# Patient Record
Sex: MALE | Race: WHITE
[De-identification: names, ages, dates, MRNs, and addresses within clinical notes are randomized per-mention and may not be internally consistent; named-entity substitution may affect disease eponyms.]

---

## 2019-01-14 ENCOUNTER — HOSPITAL ENCOUNTER (OUTPATIENT)
Dept: HOSPITAL 46 - DS | Age: 64
LOS: 1 days | Discharge: HOME | End: 2019-01-15
Attending: UROLOGY
Payer: COMMERCIAL

## 2019-01-14 VITALS — HEIGHT: 74 IN | BODY MASS INDEX: 36.57 KG/M2 | WEIGHT: 284.99 LBS

## 2019-01-14 DIAGNOSIS — N13.8: ICD-10-CM

## 2019-01-14 DIAGNOSIS — R97.20: ICD-10-CM

## 2019-01-14 DIAGNOSIS — Z79.899: ICD-10-CM

## 2019-01-14 DIAGNOSIS — N41.1: ICD-10-CM

## 2019-01-14 DIAGNOSIS — N40.1: Primary | ICD-10-CM

## 2019-01-14 DIAGNOSIS — Z79.2: ICD-10-CM

## 2019-01-14 PROCEDURE — 0VT08ZZ RESECTION OF PROSTATE, VIA NATURAL OR ARTIFICIAL OPENING ENDOSCOPIC: ICD-10-PCS | Performed by: UROLOGY

## 2019-01-14 NOTE — NUR
THIS RN ASSUMING CARE OF PT. PT AWAKE AND RESPONDING APPROPRIATLY. PT DENIES
PAIN AND NAUSEA. SPINAL LEVEL S2. ASSESSMENT DONE. CATHETER DRAINING STRAW
COLORED URINE. IV FLUID STARTED. PT ASSISTED WITH ORDERING LUNCH. NO
ADDITIONAL REQUESTS OR COMPLAINTS AT THIS TIME. WIFE AT BEDSIDE. CALL LIGHT
WITHIN REACH. BED RAILS UP.

## 2019-01-14 NOTE — NUR
THIS RN TO ROOM TO CHECK ON PT. PT ASSISTED UP AND BACK TO CHAIR. PINK URINE
NOTED WITH TRANSFER. 3WAY BLADDER IRRIGATION FLUSHED AFTER TRANSFER. URINE
RETURNS TO LIGHT PINK. PT WATCHING TV. NO ADDITIONAL REQUESTS OR COMPLAINTS AT
THIS TIME.

## 2019-01-14 NOTE — NUR
VITALS AND ASSESSMENT DUE. THIS RN TO BEDSIDE. PT WORKING ON CALENDAR/SCHEDULE
WITH WIFE. PT DENIES PAIN AND NAUSEA. VITALS TAKEN. ASSESSMENT DONE. NO
ADDITIONAL DRAINAGE NOTED FROM PENIS. CATHETER DRAINING CLEAR YELLOW URINE.
IRRIGATION CLAMMPED. WATER REFILLED. PT CONTINUES PLANNING WITH WIFE. NO
ADDITIONAL REQUESTS OR COMPLAINTS. CALL LIGHT WITHIN REACH. BED RAILS UP.

## 2019-01-14 NOTE — NUR
01/14/19 1135 Sheets,Audrey
1125 PT ARRIVED TO PACU ON 6L VIA MASK, PT REACTIVE TO VERBAL STIMULI.
 
1128 PT REACHING UP AND GRABBING O2 MASK, O2 MASK REMOVED. PT DENIES
PAIN AND NAUSEA. PT MORE AWAKE AND REPORTS MOUTH DRY.
 
1135 PT SIPPING WATER PER REQUEST AND SITTING IN SEMI FOWLERS. CNI
TITRATED TO URINE, URINE IS CLEAR.

## 2019-01-14 NOTE — NUR
PT PLANS ON RETURNING TO HIS HOME WITH HIS WIFE UPON DC, WILL FOLLOW DURING
HIS STAY IN THE HOSPITAL.  NO NOTED BARRIERS TO DC.

## 2019-01-14 NOTE — NUR
VITALS, ASSESSMENT AND MEDICATION DUE. THIS RN TO BEDSIDE. PT TALKING WITH
CASE MANAGEMENT. VITALS TAKEN. PT DENIES PAIN AND NAUSEA. ASSESSMENT DONE.
CLEAR YELLOW URINE NOTED IN CATHETER. BLADDER IRRIGATION REMAINS CLAMPED. ABX
STARTED. PT WORKING ON TABLET. NO ADDITIONAL REQUESTS OR COMPLAINTS. CALL
LIGHT WITHIN REACH.

## 2019-01-14 NOTE — NUR
PATIENT IN CHAIR, WIFE IN ROOM. FRESH WATER GIVEN. CALL LIGHT IN REACH. NO
FURTHER NEEDS AT THIS TIME.

## 2019-01-14 NOTE — NUR
ROUNDED AS CHARGE. VITALS TAKEN AND RECORDED. PATIENT DENIES ANY COMMNETS,
QUESTIONS OR CONCERNS. CARTER EMPTIED. INTAKE AND OUPUT RECORDED. FELIPA RODRIGUEZ IN
THE ROOM. NO NEEDS NOTED. CALL LIGHT IN REACH.

## 2019-01-14 NOTE — NUR
PT IN , FINISHING LUNCH-WIFE AT BS. PT STATED HIS "BUNS" WERE TINGLING, BUT
OTHERWISE HE FELT FINE. THANKED ME FOR COMING BY, EXTENDED A BLESSING. WILL
FOLLOW AS NEEDED

## 2019-01-14 NOTE — NUR
RECIEVED CHANGE OF SHIFT REPORT FROM TAYLOR RODRIGUEZ. WHITE BOARD UPATED. PATIENT
RESTING IN BED, WATCHING TV. POSSESSIONS AT BEDSIDE. IV FLUIDS
INFUSING PER MAR ORDER. SCDS IN PLACE AND ON. ROOM AIR. CALL LIGHT WITHIN
REACH. NO MORE NEEDS AT THIS TIME.

## 2019-01-14 NOTE — NUR
PT ALERT, ORIENTED AND SUPPORTED  BY HIS WIFE IZA. PT IS PREPARED, HAD FEW
QUESTIONS. HE DID REQUEST PRAYER, WILL FOLLOW AS NEEDED

## 2019-01-14 NOTE — NUR
ROUNDED ON PATIENT TO REPLACE CONTINOUS IV FLUID BAG PER MAR ORDER. PATIENT
DENIES HAVING PAIN. POSSESSIONS AT BEDSIDE. CALL LIGHT WITHIN REACH. NO MORE
NEEDS AT THIS TIME.

## 2019-01-14 NOTE — NUR
PT TO ROOM 109. PT IS ALERT AND ORIENTED, HE REPORTS NO PAIN AT THIS TIME, NO
NAUSEA, REQUEST PUDDING. BEDSIDE REPORT FROM ZACHARY RODRIGUEZ PACU

## 2019-01-14 NOTE — NUR
ASSESSMENT COMPLETE. VITALS ASSESSED AND RECOREDED. INTAKE AND OUTPUT ASSESSED
AND RECORDED. CARTER BAG EMPTIED, DRAINAGE IN CARTER TUBING IS YELLOW/LIGHT
PINK. PATIENT DENIES HAVING CHEST PAIN, SOB, DIFFICULTY BREATHING OR PAIN.
CATHETER CARE PERFORMED. DRY, RED DRAINAGED NOTED ON TUBING NEAR TIP OF
PENIS, NO NEW DRAINAGE NOTED. IV ASSESSED TO BE PATENT AND WNL, PATIENT DENIES
PAIN AT IV SITE. SCDs IN PLACE AND ON. MEDICATION ADMINSTRATION COMPLETE PER
MAR ORDER. IV FLUIDS INFUSING PER MAR ORDER. PATIENT ON BEDREST PER ORDER. CBI
IS CLAMPED. FRESH WATER BROUGHT TO PATIENT. CALL LIGHT WITHIN REACH.
POSSESSIONS AT BEDSIDE. NO MORE NEEDS AT THIS TIME.

## 2019-01-14 NOTE — NUR
VITALS AND ASSESSMENT DUE. THIS RN TO BEDSIDE. PT FINISHING LUNCH. PT DENIES
PAIN AND NAUSEA. CATHETER DRAINING CLEAR YELLOW URINE. FLUIDS ENCORUAGED.
IRRIGATION SLOWED. PT VISITING WITH WIFE. ADDITIONAL PUDDING PROVIDED. NO
ADDITIONAL REQUESTS OR COMPLAINT AT THIS TIME.

## 2019-01-14 NOTE — NUR
PT POST OP DAY ZERO FOR TURP. CONTINIOUS BLADDER IRRIGATION CLAMPED, URINE
CLEAR YELLOW TO LIGHT PINK. MINMAL RED DRAINAGE NOTED FROM PENIS.
PT UP TO CHAIR FOR DINNER PER MD REQUEST. 0-1/10
PAIN THIS SHIFT, NO MEDICATION GIVEN. PT TOLERATING PO FOOD AND FLUIDS WELL.
PT EXPECTING DISCHARGE IN EARLY MORNING. PT USES CALL LIGHT APPROPRIATLY.

## 2019-01-14 NOTE — NUR
THIS RN TO ROOM TO ROUND WITH MD. MD STATES TO GET UP TO CHAIR FOR DINNER AND
MONITOR CATHETER DRAINAGE AFTER BEING UP TO CHAIR. NOTED. PT PLANS TO GET UP
TO CHAIR FOR DINNER.

## 2019-01-14 NOTE — NUR
STAT LOCK PLACED TO SECURE CARTER CATH. BLOOD DRAINAGE NOTED AT MEATUS, GAUZE
IN PLACE. URINE IN CARTER TUBING CLEAR STRAW COLOR.

## 2019-01-15 NOTE — NUR
ROUNDED ON PATIENT FOR MEDICATION ADMINSTRATION PER MAR ORDER. PATIENT RESTING
IN BED WITH EYES CLOSED, REPIRATORY RATE IS EVEN AND UNLABORED, NO SIGN OF
TENSING OR GRIMACING. PATIENT RESTING IN RELAXED POSITION. CARTER DRAINING
LIGHT PINK URINE. POSSESSIONS AT BEDSIDE. CALL LIGHT WITHIN REACH.

## 2019-01-15 NOTE — NUR
PT ALERT, ORIENTED AND CATCHING UP ON SOME E-MAIL. PLANS TO BE DC'D SHORTLY-IS
PLEASED WITH CARE HE HAS RECEIVED AND EXPECTED PROCEDURE TO BE MORE PAINFUL
AND MORE DISCOMFORT THANT HE HAS EXPERIENCED SO FAR. HAD GOOD VISIT, STUDENTS
IN TO TAKE VS, EXTENDED BLESSING, WILL FOLLOW AS NEEDED

## 2019-01-15 NOTE — NUR
REPORT RECIEVED FROM JENNIFER LEO AT BEDSIDE. PT AWAKE AND TALKATIVE. PT DENIES
PAIN OR CONCERNS AND HOPES TO BE DISCHARGED SHORTLY. CARTER DRAINING LIGHT PINK
TO YELLOW URINE.

## 2019-01-15 NOTE — EKG
[facility]
                                    [address]
                                  [place]  [address]
_________________________________________________________________________________________
                                                                 Signed   
 
 
Normal sinus rhythm
Normal ECG
When compared with ECG of 28-NOV-2018 13:38,
Criteria for Inferior infarct are no longer present
Confirmed by SUSAN DAHL MD ([id number]) on 1/15/2019 6:19:59 AM
 
 
 
 
 
 
 
 
 
 
 
 
 
 
 
 
 
 
 
 
 
 
 
 
 
 
 
 
 
 
 
 
 
 
 
 
 
 
 
 
    Electronically Signed By: SUSAN DAHL MD  01/15/19 0620
_________________________________________________________________________________________
PATIENT NAME:     ROMELIA IVEY ISADORA                       
MEDICAL RECORD #: [id number]                     Electrocardiogram             
          ACCT #: [id number]  
DATE OF BIRTH:   [birth year]                                       
PHYSICIAN:   SUSAN DAHL MD                   REPORT #: [id number]
REPORT IS CONFIDENTIAL AND NOT TO BE RELEASED WITHOUT AUTHORIZATION

## 2019-01-15 NOTE — NUR
ASSESSMENT COMPLETE. INTAKE AND OUTPUT ASSESSED AND RECORDED. VITALS ASSESSED
AND RECORDED. PATIENT DENIES PAIN. PATIENT DENIES HAVING SOB, CHEST PAIN, OR
DIFFICULTY BREATHING. IV FLUIDS INFUSING PER ORDER. SMALL AMOUNT OF RED, DRY
DRAINAGE NOTED ON CARTER AT TIP OF PENIS, CATHETER CARE PERFORMED. FRESH WATER
BROUGHT TO PATIENT. 3 WAY CARTER DRAINING LIGHT PINK-YELLOW URINE. CALL LIGHT
WITHIN REACH. POSSESSIONS AT BEDSIDE. NO MORE NEEDS AT THIS TIME.

## 2019-01-15 NOTE — NUR
ASSESSMENT COMPLETE. VITALS ASSESSED AND RECORDED. INTAKE AND OUTPUT ASSESSED
AND RECORDED. CARTER BAG EMPTIED, DRAINAGE IN TUBING IS LIGHT PINK IN COLOR.
PATIENT DENIES HAVING CHEST PAIN, SOB, OR DIFFICULT BREATHING. IV ASSESSED TO
BE PATENT AND WNL, PATIENT DENIES PAIN. IV FLUIDS INFUSING PER MAR ORDER. SCDs
IN PLACE AND ON. CARTER CATHETER PATENT. CALL LIGHT WITHIN REACH. POSSESSIONS
AT BEDSIDE. CALL LIGHT WITHIN REACH. BEDREST PER ORDER.

## 2019-01-15 NOTE — NUR
PATIENTS VITALS TAKEN AND RECORDED. PATIENTS CARTER EMPTIED. INTAKE AND OUPUT
RECORDED. FELIPA RN IN ROOM. NO NEEDS NOTED. CALL LIGHT IN REACH. CBE REMAINS
CLAMPED. URINE YELLOW TO LIGHT PINK IN COLOR. NO NEEDS NOTED. CALL LIGHT IN
REACH.

## 2019-01-15 NOTE — NUR
ROUNDED ON PATIENT RESTING IN BED WITH EYES CLOSED, RESPIRATORY RATE IS EVEN
AND UNLABORED. NO SIGNS OF TENSING OR GRIMACING. CARTER CATHETER DRAINING LIGHT
PINK URINE. CALL LIGHT WITHIN REACH.

## 2019-01-15 NOTE — NUR
POST OP TURP (1/14). PATIENT
SLEPT THROUGHOUT THE SHIFT. BEDREST PER ORDER. REGULAR DIET.
CONTINUOUS BLADDER IRRIGATION HAS BEEN CLAMPED SINCE 1/14 AT 1400. 3 WAY CARTER
CATHETER DRAINING LIGHT PINK-YELLOW
URINE. SCDs. CONTINOUS IV FLUIDS
INFUSING. NO PRN PAIN MEDICATIONS THIS SHIFT. ROOM AIR. USES CALL LIGHT
APPROPRIATELY.

## 2019-01-16 NOTE — OR
[facility]
                                    [address], [place]  [address]
_________________________________________________________________________________________
                                                                 Signed   
 
 
DATE OF OPERATION:
01/14/2019
 
SURGEON:
Radha Currie MD
 
PREOPERATIVE DIAGNOSES:
1. Long-standing bladder outlet obstruction secondary to severe benign prostatic
hyperplasia. 
2. Recurrent urinary tract infections.
 
POSTOPERATIVE DIAGNOSES:
1. Long-standing bladder outlet obstruction secondary to severe benign prostatic
hyperplasia. 
2. Recurrent urinary tract infections.
 
NAMES OF PROCEDURES:
1. Urethral dilation using Fallston sounds, from 20-Malian to 30-Malian.
2. Transurethral resection of the prostate.
3. Colindres catheter insertion, complicated.
 
ANESTHESIA:
Spinal anesthesia with sedation.
 
ESTIMATED BLOOD LOSS:
25 mL.
 
COMPLICATIONS:
None.
 
SPECIMENS:
Prostate chips sent to pathology for evaluation.
 
DRAINS:
A 22-Malian three way Colindres catheter, connected to continuous bladder irrigation.
 
INDICATIONS FOR PROCEDURE:
Mr. Wetzel is a 63-year-old gentleman with a history of long-standing lower urinary tract
symptoms including weakness in force of stream along with nocturia.  Late last year, he
underwent diagnostic cystoscopy, which revealed trilobar hyperplasia, along with
evidence of long-standing bladder outlet obstruction.  It was at that time, that the
patient agreed to undergo transurethral resection of the prostate for definitive
 
    Electronically Signed By: RADHA CURRIE MD  01/16/19 1842
_________________________________________________________________________________________
PATIENT NAME:     ROMELIA WETZEL                       
MEDICAL RECORD #: [id number]            OPERATIVE REPORT              
          ACCT #: [id number]  
DATE OF BIRTH:   [birth year]            REPORT #: [id number]      
PHYSICIAN:        RADHA CURRIE MD               
PCP:              RADHA CURRIE MD               
REPORT IS CONFIDENTIAL AND NOT TO BE RELEASED WITHOUT AUTHORIZATION
 
 
                                  [facility]
                                    [address], [place]  [address]
_________________________________________________________________________________________
                                                                 Signed   
 
 
management of his urinary symptoms.  His planned surgery was delayed slightly due to a
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
resected by Dr. Howard earlier this month.  The patient presents today to undergo a
transurethral resection of the prostate for management of his lower urinary tract
symptoms. 
 
OPERATIVE FINDINGS:
1. On cystoscopy, there was no evidence of any suspicious masses, lesions, or stones.
Bilateral ureteral orifices are in their normal anatomic location and effluxing clear
urine. 
2. Digital rectal examination was performed, which revealed approximately 80 g prostate,
that is smooth and rubbery, but not firm.  There are no focal nodules palpated. 
3. The patient's urethra was dilated from 20-Malian to 30-Malian using Fallston sounds
without difficulty. 
4. The patient's prostate was resected using a 24-Malian loop, beginning in the median
lobe and then continuing to the left and then right lobes of the prostate.  The
resection was carried down to the level of the verumontanum.  I avoided any resection
near the external sphincter. 
5. At the end of the procedure, the patient's 22-Malian three-way Colindres catheter was
inserted into the patient's bladder over a Sensor wire. 
 
DESCRIPTION OF PROCEDURE:
After informed consent was obtained, the patient was taken back to the operating room.
He was transferred from the [facility] to the operative room table, where a spinal
anesthesia was initiated.  He was placed in the dorsal lithotomy position and his
genitalia was prepped and draped in the standard sterile fashion.  The patient's urethra
was dilated using Fallston sounds from 20-Malian to 30-Malian.  A 28-Malian sheath was
then inserted into the patient's urethra using a visual obturator.  The visual obturator
was then removed and a resectoscope with a 24-Malian loop was inserted into the
patient's bladder.  I was unable to get a good visualization of the bilateral ureteral
orifices due to the significant length of the patient's prostatic urethra.  Overall, I
was able to bring my loop to the level of the bladder neck, but not beyond the bladder
neck.  Therefore, my cystoscopy was somewhat limited.  I began resection of the median
lobe of the prostate down to the level of the verumontanum.  This was followed by the
left lateral and right lateral lobes of the prostate.  The resection was performed from
the bladder neck down to the level of the verumontanum.  The resection was performed
with a moderate amount of bleeding during the resection.  However, this was maintained
under good control with the electrocautery.  Once I was satisfied that most of the chips
had been successfully resected, I switched to the 24-Malian loop out for the bipolar
button, and I did continue to vaporize some additional prostatic tissue.  I also was
able to cauterize the bed of the prostate to achieve maximum hemostasis.  Once I was
satisfied that hemostasis had been achieved, I removed the resectoscope and inserted a
 
    Electronically Signed By: RADHA CURRIE MD  01/16/19 1842
_________________________________________________________________________________________
PATIENT NAME:     ROMELIA WETZEL                       
MEDICAL RECORD #: [id number]            OPERATIVE REPORT              
          ACCT #: [id number]  
DATE OF BIRTH:   [birth year]            REPORT #: [id number]      
PHYSICIAN:        RADHA CURRIE MD               
PCP:              RADHA CURRIE MD               
REPORT IS CONFIDENTIAL AND NOT TO BE RELEASED WITHOUT AUTHORIZATION
 
 
                                  [address]  [address]
_________________________________________________________________________________________
                                                                 Signed   
 
 
Jay syringe.  The prostatic chips were then irrigated from the patient's bladder
using the Jay syringe.  I reinserted the resectoscope to be sure there were no
additional bleeders that needed to be cauterized.  Once this was done, the resectoscope
was removed, leaving the sheath in place.  A Sensor wire was inserted through the sheath
and into the patient's bladder.  The sheath was removed intact.  Over the wire, a
22-Malian three-way Colindres catheter was inserted into the patient's bladder and then was
connected to continuous bladder irrigation.  The procedure was then terminated.  The
patient tolerated the procedure well without any complication.  He will now be
transferred to the postanesthesia care unit in stable condition. 
 
DISPOSITION:
The patient will now be transferred to the postanesthesia care unit, where he will
recover from anesthesia.  He will be placed in the Med-Surg unit overnight for
management and weaning of his continuous bladder irrigation.  I plan to also continue
with his IV Zosyn overnight given his recent positive urine culture of E. coli obtained
just prior to his surgery.  Of note, he was given Augmentin as a result of that culture.
 I discussed the details of today's procedure with the patient's wife and answered all
of her questions.  He will be scheduled to return to clinic this Thursday to undergo a
voiding trial.  He has been told to continue his oral Augmentin once he was discharge
from the hospital tomorrow and was given a small amount of narcotics for pain control. 
 
 
 
            ________________________________________
            MD JACOB Ramirez/YANIV
Job #:  [id number]
DD:  01/15/2019 15:35:10
DT:  01/16/2019 00:56:17
 
 
Copies:                                
~
 
 
 
 
 
 
 
 
    Electronically Signed By: RADHA CURRIE MD  01/16/19 1842
_________________________________________________________________________________________
PATIENT NAME:     ROMELIA WETZEL                       
MEDICAL RECORD #: [id number]            OPERATIVE REPORT              
          ACCT #: [id number]  
DATE OF BIRTH:   [birth year]            REPORT #: [id number]      
PHYSICIAN:        RADHA CURRIE MD               
PCP:              RADHA CURRIE MD               
REPORT IS CONFIDENTIAL AND NOT TO BE RELEASED WITHOUT AUTHORIZATION

## 2019-11-01 NOTE — XMS
PreManage Notification: ROMELIA IVEY MRN:[id number]
 
Security Information
 
Security Events
No recent Security Events currently on file
 
 
 
CRITERIA MET
------------
- Group Notification
 
 
CARE PROVIDERS
There are no care providers on record at this time.
 
[facility] has no Care Guidelines for this patient.
 
RM VISIT COUNT (12 MO.)
-------------------------------------------------------------------------------------
2 [facility]
-------------------------------------------------------------------------------------
TOTAL 2
-------------------------------------------------------------------------------------
NOTE: Visits indicate total known visits.
 
ED/UCC VISIT TRACKING (12 MO.)
-------------------------------------------------------------------------------------
11/01/2019 10:18
[facility]
 
TYPE: Emergency
 
COMPLAINT:
- URINE PROBLEM
-------------------------------------------------------------------------------------
11/18/2018 15:46
[facility]
 
TYPE: Emergency
 
COMPLAINT:
- FLANK PAIN
 
DIAGNOSES:
- Other long term (current) drug therapy
- Calculus of kidney
- Unspecified abdominal pain
-------------------------------------------------------------------------------------
 
 
INPATIENT VISIT TRACKING (12 MO.)
-------------------------------------------------------------------------------------
12/18/2018 10:02
[facility]
 
TYPE: Medical Surgical
 
 
COMPLAINT:
- EXPLORATION/EXCISION PARATHYROID W/OIP
 
DIAGNOSES:
- Benign neoplasm of parathyroid gland
- Personal history of urinary calculi
- Hesitancy of micturition
- Benign prostatic hyperplasia with lower urinary tract symp
- Primary hyperparathyroidism
- Other long term (current) drug therapy
- Other specified disorders of bladder
-------------------------------------------------------------------------------------
 
[email or web address]